# Patient Record
Sex: FEMALE | Race: WHITE | Employment: OTHER | URBAN - METROPOLITAN AREA
[De-identification: names, ages, dates, MRNs, and addresses within clinical notes are randomized per-mention and may not be internally consistent; named-entity substitution may affect disease eponyms.]

---

## 2019-03-21 NOTE — PROGRESS NOTES
[facility] / [facility], [place] ([facility]) [address], [address]    Acknowledgment of Informed Consent for Surgical or Medical Procedure and Sedation  I agree to allow doctor(s) Jacques Mullen and his/her associates or assistants, including residents and/or other qualified medical practitioner to perform the following medical treatment or procedure and to administer or direct the administration of sedation as necessary:  Procedure(s): GASTROCNEMIUS RECESSION RIGHT FOOT, PERONEUS LONGUS AND BREVIS TENDON REPAIRS, POSSIBLE TENDON TRANSFER RIGHT FOOT, CALCANEUS OSTEOTOMY RIGHT FOOT, POSSIBLE FIRST [address]  My doctor has explained the following regarding the proposed procedure:   the explanation of the procedure   the benefits of the procedure   the potential problems that might occur during recuperation   the risks and side effects of the procedure which could include but are not limited to severe blood loss, infection, stroke or death   the benefits, risks and side effect of alternative procedures including the consequences of declining this procedure or any alternative procedures   the likelihood of achieving satisfactory results. I acknowledge no guarantee or assurance has been made to me regarding the results. I understand that during the course of this treatment/procedure, unforeseen conditions can occur which require an additional or different procedure. I agree to allow my physician or assistants to perform such extension of the original procedure as they may find necessary. I understand that sedation will often result in temporary impairment of memory and fine motor skills and that sedation can occasionally progress to a state of deep sedation or general anesthesia.     I understand the risks of anesthesia for surgery include, but are not limited to, sore throat, hoarseness, injury to face, mouth, or teeth; nausea; headache; injury to blood vessels or nerves; death, brain damage, or paralysis. I understand that if I have a Limitation of Treatment order in effect during my hospitalization, the order may or may not be in effect during this procedure. I give my doctor permission to give me blood or blood products. I understand that there are risks with receiving blood such as hepatitis, AIDS, fever, or allergic reaction. I acknowledge that the risks, benefits, and alternatives of this treatment have been explained to me and that no express or implied warranty has been given by the hospital, any blood bank, or any person or entity as to the blood or blood components transfused. At the discretion of my doctor, I agree to allow observers, equipment/product representatives and allow photographing, and/or televising of the procedure, provided my name or identity is maintained confidentially. I agree the hospital may dispose of or use for scientific or educational purposes any tissue, fluid, or body parts which may be removed.     ________________________________Date________Time______ am/pm  ([place])  Patient or Signature of Closest Relative or Legal Guardian    ________________________________Date________Time______am/pm      Page 1 of  1  Witness

## 2019-03-25 ENCOUNTER — ANESTHESIA EVENT (OUTPATIENT)
Dept: OPERATING ROOM | Age: 69
End: 2019-03-25
Payer: MEDICARE

## 2019-03-26 NOTE — PROGRESS NOTES
PRE-OP INSTRUCTIONS FOR THE SURGICAL PATIENT YOU ARE UNABLE TO MAKE CONTACT FOR AN INTERVIEW:      1. Follow instructions for your ARRIVAL TIME as DIRECTED BY YOUR SURGEON. 2. Enter the MAIN entrance located on [facility] and report to the desk. 3. Bring your insurance & prescription card and photo ID with you. You may also be asked to pay a co-pay, as you may want to bring a check or credit card with you. 4. Leave all other valuables at home. 5. Arrange for someone to drive you home and be with you for the first 24 hours after discharge. 6. You must contact your surgeon for ALL medication instructions, especially if taking blood thinners, aspirin, or diabetic medication. 7. A Pre-op History and Physical for surgery MUST be completed by your Physician or an Urgent Care within 30 days of your procedure date. Please bring a copy with you on the day of your procedure and along with any other testing performed. 8. DO NOT EAT ANYTHING eight hours prior to surgery. May have up to 8 ounces of water 4 hours prior to surgery. 9. No gum, candy, mints, or ice chips day of procedure. 10. Please refrain from drinking alcohol the day before or day of your procedure. 11. Please do not smoke the day of your procedure. 12. Dress in loose, comfortable clothing appropriate for redressing after your procedure. Do not wear jewelry (including body piercings), make-up, fingernail polish, lotion, powders or metal hairclips. 15. Contacts will need to be removed prior to surgery. You may want to bring your            Eye glasses to wear immediately before and after surgery. 14. Dentures will need to be removed before your procedure. 13. Bring cases for your glasses, contacts, dentures, or hearing aids to protect them while you are in surgery. 16. If you use a CPAP, please bring it with you on the day of your procedure.   17. Do not shave or wax for 72 hours prior to procedure near your

## 2019-03-28 ENCOUNTER — ANESTHESIA (OUTPATIENT)
Dept: OPERATING ROOM | Age: 69
End: 2019-03-28
Payer: MEDICARE

## 2019-03-28 ENCOUNTER — HOSPITAL ENCOUNTER (OUTPATIENT)
Age: 69
Setting detail: OBSERVATION
Discharge: HOME | End: 2019-03-29
Attending: ORTHOPAEDIC SURGERY | Admitting: ORTHOPAEDIC SURGERY
Payer: MEDICARE

## 2019-03-28 VITALS — DIASTOLIC BLOOD PRESSURE: 44 MMHG | OXYGEN SATURATION: 96 % | SYSTOLIC BLOOD PRESSURE: 82 MMHG | TEMPERATURE: 95.5 F

## 2019-03-28 DIAGNOSIS — M76.72 PERONEAL TENDINITIS OF LEFT LOWER EXTREMITY: Primary | ICD-10-CM

## 2019-03-28 LAB
ANION GAP SERPL CALCULATED.3IONS-SCNC: 10 MMOL/L (ref 3–16)
BUN BLDV-MCNC: 11 MG/DL (ref 7–20)
CALCIUM SERPL-MCNC: 9 MG/DL (ref 8.3–10.6)
CHLORIDE BLD-SCNC: 106 MMOL/L (ref 99–110)
CO2: 26 MMOL/L (ref 21–32)
CREAT SERPL-MCNC: <0.5 MG/DL (ref 0.6–1.2)
GFR AFRICAN AMERICAN: >60
GFR NON-AFRICAN AMERICAN: >60
GLUCOSE BLD-MCNC: 115 MG/DL (ref 70–99)
HCT VFR BLD CALC: 39.8 % (ref 36–48)
HEMOGLOBIN: 13.1 G/DL (ref 12–16)
MCH RBC QN AUTO: 29.8 PG (ref 26–34)
MCHC RBC AUTO-ENTMCNC: 33 G/DL (ref 31–36)
MCV RBC AUTO: 90.4 FL (ref 80–100)
PDW BLD-RTO: 14.3 % (ref 12.4–15.4)
PLATELET # BLD: 186 K/UL (ref 135–450)
PMV BLD AUTO: 8 FL (ref 5–10.5)
POTASSIUM SERPL-SCNC: 4.3 MMOL/L (ref 3.5–5.1)
RBC # BLD: 4.4 M/UL (ref 4–5.2)
SODIUM BLD-SCNC: 142 MMOL/L (ref 136–145)
WBC # BLD: 8.7 K/UL (ref 4–11)

## 2019-03-28 PROCEDURE — C1713 ANCHOR/SCREW BN/BN,TIS/BN: HCPCS | Performed by: ORTHOPAEDIC SURGERY

## 2019-03-28 PROCEDURE — 2720000010 HC SURG SUPPLY STERILE: Performed by: ORTHOPAEDIC SURGERY

## 2019-03-28 PROCEDURE — 6360000002 HC RX W HCPCS: Performed by: ANESTHESIOLOGY

## 2019-03-28 PROCEDURE — 2580000003 HC RX 258: Performed by: ORTHOPAEDIC SURGERY

## 2019-03-28 PROCEDURE — 3700000001 HC ADD 15 MINUTES (ANESTHESIA): Performed by: ORTHOPAEDIC SURGERY

## 2019-03-28 PROCEDURE — 3700000000 HC ANESTHESIA ATTENDED CARE: Performed by: ORTHOPAEDIC SURGERY

## 2019-03-28 PROCEDURE — 88304 TISSUE EXAM BY PATHOLOGIST: CPT

## 2019-03-28 PROCEDURE — 7100000000 HC PACU RECOVERY - FIRST 15 MIN: Performed by: ORTHOPAEDIC SURGERY

## 2019-03-28 PROCEDURE — 6360000002 HC RX W HCPCS: Performed by: ORTHOPAEDIC SURGERY

## 2019-03-28 PROCEDURE — 7100000010 HC PHASE II RECOVERY - FIRST 15 MIN: Performed by: ORTHOPAEDIC SURGERY

## 2019-03-28 PROCEDURE — 7100000001 HC PACU RECOVERY - ADDTL 15 MIN: Performed by: ORTHOPAEDIC SURGERY

## 2019-03-28 PROCEDURE — 3600000004 HC SURGERY LEVEL 4 BASE: Performed by: ORTHOPAEDIC SURGERY

## 2019-03-28 PROCEDURE — 64447 NJX AA&/STRD FEMORAL NRV IMG: CPT | Performed by: ANESTHESIOLOGY

## 2019-03-28 PROCEDURE — 85027 COMPLETE CBC AUTOMATED: CPT

## 2019-03-28 PROCEDURE — C1769 GUIDE WIRE: HCPCS | Performed by: ORTHOPAEDIC SURGERY

## 2019-03-28 PROCEDURE — 3600000014 HC SURGERY LEVEL 4 ADDTL 15MIN: Performed by: ORTHOPAEDIC SURGERY

## 2019-03-28 PROCEDURE — 2709999900 HC NON-CHARGEABLE SUPPLY: Performed by: ORTHOPAEDIC SURGERY

## 2019-03-28 PROCEDURE — 64445 NJX AA&/STRD SCIATIC NRV IMG: CPT | Performed by: ANESTHESIOLOGY

## 2019-03-28 PROCEDURE — 6360000002 HC RX W HCPCS: Performed by: NURSE ANESTHETIST, CERTIFIED REGISTERED

## 2019-03-28 PROCEDURE — 36415 COLL VENOUS BLD VENIPUNCTURE: CPT

## 2019-03-28 PROCEDURE — 94761 N-INVAS EAR/PLS OXIMETRY MLT: CPT

## 2019-03-28 PROCEDURE — 80048 BASIC METABOLIC PNL TOTAL CA: CPT

## 2019-03-28 PROCEDURE — G0378 HOSPITAL OBSERVATION PER HR: HCPCS

## 2019-03-28 PROCEDURE — 2500000003 HC RX 250 WO HCPCS: Performed by: NURSE ANESTHETIST, CERTIFIED REGISTERED

## 2019-03-28 PROCEDURE — 2580000003 HC RX 258: Performed by: ANESTHESIOLOGY

## 2019-03-28 PROCEDURE — 2500000003 HC RX 250 WO HCPCS: Performed by: ANESTHESIOLOGY

## 2019-03-28 PROCEDURE — 7100000011 HC PHASE II RECOVERY - ADDTL 15 MIN: Performed by: ORTHOPAEDIC SURGERY

## 2019-03-28 RX ORDER — SODIUM CHLORIDE 9 MG/ML
INJECTION, SOLUTION INTRAVENOUS
Status: DISPENSED
Start: 2019-03-28 | End: 2019-03-29

## 2019-03-28 RX ORDER — HEPARIN SODIUM 5000 [USP'U]/ML
5000 INJECTION, SOLUTION INTRAVENOUS; SUBCUTANEOUS EVERY 8 HOURS SCHEDULED
Status: DISCONTINUED | OUTPATIENT
Start: 2019-03-29 | End: 2019-03-29 | Stop reason: HOSPADM

## 2019-03-28 RX ORDER — ROPIVACAINE HYDROCHLORIDE 5 MG/ML
INJECTION, SOLUTION EPIDURAL; INFILTRATION; PERINEURAL
Status: COMPLETED | OUTPATIENT
Start: 2019-03-28 | End: 2019-03-28

## 2019-03-28 RX ORDER — MIDAZOLAM HYDROCHLORIDE 1 MG/ML
INJECTION INTRAMUSCULAR; INTRAVENOUS PRN
Status: DISCONTINUED | OUTPATIENT
Start: 2019-03-28 | End: 2019-03-28 | Stop reason: SDUPTHER

## 2019-03-28 RX ORDER — ROPIVACAINE HYDROCHLORIDE 5 MG/ML
30 INJECTION, SOLUTION EPIDURAL; INFILTRATION; PERINEURAL ONCE
Status: DISCONTINUED | OUTPATIENT
Start: 2019-03-28 | End: 2019-03-28 | Stop reason: HOSPADM

## 2019-03-28 RX ORDER — CLINDAMYCIN PHOSPHATE 900 MG/50ML
INJECTION INTRAVENOUS PRN
Status: DISCONTINUED | OUTPATIENT
Start: 2019-03-28 | End: 2019-03-28 | Stop reason: SDUPTHER

## 2019-03-28 RX ORDER — MORPHINE SULFATE 2 MG/ML
2 INJECTION, SOLUTION INTRAMUSCULAR; INTRAVENOUS
Status: DISCONTINUED | OUTPATIENT
Start: 2019-03-28 | End: 2019-03-29 | Stop reason: HOSPADM

## 2019-03-28 RX ORDER — FENTANYL CITRATE 50 UG/ML
100 INJECTION, SOLUTION INTRAMUSCULAR; INTRAVENOUS ONCE
Status: COMPLETED | OUTPATIENT
Start: 2019-03-28 | End: 2019-03-28

## 2019-03-28 RX ORDER — ONDANSETRON 2 MG/ML
INJECTION INTRAMUSCULAR; INTRAVENOUS PRN
Status: DISCONTINUED | OUTPATIENT
Start: 2019-03-28 | End: 2019-03-28 | Stop reason: SDUPTHER

## 2019-03-28 RX ORDER — HYDROCODONE BITARTRATE AND ACETAMINOPHEN 5; 325 MG/1; MG/1
2 TABLET ORAL EVERY 4 HOURS PRN
Status: DISCONTINUED | OUTPATIENT
Start: 2019-03-28 | End: 2019-03-29 | Stop reason: HOSPADM

## 2019-03-28 RX ORDER — GLYCOPYRROLATE 0.2 MG/ML
INJECTION INTRAMUSCULAR; INTRAVENOUS PRN
Status: DISCONTINUED | OUTPATIENT
Start: 2019-03-28 | End: 2019-03-28 | Stop reason: SDUPTHER

## 2019-03-28 RX ORDER — ROCURONIUM BROMIDE 10 MG/ML
INJECTION, SOLUTION INTRAVENOUS PRN
Status: DISCONTINUED | OUTPATIENT
Start: 2019-03-28 | End: 2019-03-28 | Stop reason: SDUPTHER

## 2019-03-28 RX ORDER — HYDROCODONE BITARTRATE AND ACETAMINOPHEN 5; 325 MG/1; MG/1
1 TABLET ORAL EVERY 4 HOURS PRN
Status: DISCONTINUED | OUTPATIENT
Start: 2019-03-28 | End: 2019-03-29 | Stop reason: HOSPADM

## 2019-03-28 RX ORDER — SODIUM CHLORIDE 9 MG/ML
INJECTION, SOLUTION INTRAVENOUS CONTINUOUS
Status: DISCONTINUED | OUTPATIENT
Start: 2019-03-28 | End: 2019-03-29 | Stop reason: HOSPADM

## 2019-03-28 RX ORDER — SODIUM CHLORIDE 0.9 % (FLUSH) 0.9 %
10 SYRINGE (ML) INJECTION PRN
Status: DISCONTINUED | OUTPATIENT
Start: 2019-03-28 | End: 2019-03-29 | Stop reason: HOSPADM

## 2019-03-28 RX ORDER — GLYCOPYRROLATE 0.2 MG/ML
0.1 INJECTION INTRAMUSCULAR; INTRAVENOUS ONCE
Status: COMPLETED | OUTPATIENT
Start: 2019-03-28 | End: 2019-03-28

## 2019-03-28 RX ORDER — SODIUM CHLORIDE, SODIUM LACTATE, POTASSIUM CHLORIDE, CALCIUM CHLORIDE 600; 310; 30; 20 MG/100ML; MG/100ML; MG/100ML; MG/100ML
INJECTION, SOLUTION INTRAVENOUS CONTINUOUS
Status: DISCONTINUED | OUTPATIENT
Start: 2019-03-28 | End: 2019-03-29 | Stop reason: HOSPADM

## 2019-03-28 RX ORDER — DEXAMETHASONE SODIUM PHOSPHATE 4 MG/ML
INJECTION, SOLUTION INTRA-ARTICULAR; INTRALESIONAL; INTRAMUSCULAR; INTRAVENOUS; SOFT TISSUE PRN
Status: DISCONTINUED | OUTPATIENT
Start: 2019-03-28 | End: 2019-03-28 | Stop reason: SDUPTHER

## 2019-03-28 RX ORDER — PROPOFOL 10 MG/ML
INJECTION, EMULSION INTRAVENOUS PRN
Status: DISCONTINUED | OUTPATIENT
Start: 2019-03-28 | End: 2019-03-28 | Stop reason: SDUPTHER

## 2019-03-28 RX ORDER — MORPHINE SULFATE 2 MG/ML
4 INJECTION, SOLUTION INTRAMUSCULAR; INTRAVENOUS
Status: DISCONTINUED | OUTPATIENT
Start: 2019-03-28 | End: 2019-03-29 | Stop reason: HOSPADM

## 2019-03-28 RX ORDER — CEFAZOLIN SODIUM 2 G/50ML
2 SOLUTION INTRAVENOUS ONCE
Status: DISCONTINUED | OUTPATIENT
Start: 2019-03-28 | End: 2019-03-28 | Stop reason: HOSPADM

## 2019-03-28 RX ORDER — MIDAZOLAM HYDROCHLORIDE 1 MG/ML
4 INJECTION INTRAMUSCULAR; INTRAVENOUS
Status: COMPLETED | OUTPATIENT
Start: 2019-03-28 | End: 2019-03-28

## 2019-03-28 RX ORDER — SODIUM CHLORIDE 0.9 % (FLUSH) 0.9 %
10 SYRINGE (ML) INJECTION EVERY 12 HOURS SCHEDULED
Status: DISCONTINUED | OUTPATIENT
Start: 2019-03-28 | End: 2019-03-29 | Stop reason: HOSPADM

## 2019-03-28 RX ORDER — CEFAZOLIN SODIUM 2 G/50ML
2 SOLUTION INTRAVENOUS EVERY 8 HOURS
Status: COMPLETED | OUTPATIENT
Start: 2019-03-28 | End: 2019-03-29

## 2019-03-28 RX ORDER — FENTANYL CITRATE 50 UG/ML
INJECTION, SOLUTION INTRAMUSCULAR; INTRAVENOUS PRN
Status: DISCONTINUED | OUTPATIENT
Start: 2019-03-28 | End: 2019-03-28 | Stop reason: SDUPTHER

## 2019-03-28 RX ORDER — ONDANSETRON 2 MG/ML
4 INJECTION INTRAMUSCULAR; INTRAVENOUS EVERY 6 HOURS PRN
Status: DISCONTINUED | OUTPATIENT
Start: 2019-03-28 | End: 2019-03-29 | Stop reason: HOSPADM

## 2019-03-28 RX ADMIN — PHENYLEPHRINE HYDROCHLORIDE 100 MCG: 10 INJECTION, SOLUTION INTRAMUSCULAR; INTRAVENOUS; SUBCUTANEOUS at 09:55

## 2019-03-28 RX ADMIN — SODIUM CHLORIDE, POTASSIUM CHLORIDE, SODIUM LACTATE AND CALCIUM CHLORIDE: 600; 310; 30; 20 INJECTION, SOLUTION INTRAVENOUS at 07:45

## 2019-03-28 RX ADMIN — PHENYLEPHRINE HYDROCHLORIDE 200 MCG: 10 INJECTION, SOLUTION INTRAMUSCULAR; INTRAVENOUS; SUBCUTANEOUS at 08:44

## 2019-03-28 RX ADMIN — CEFAZOLIN SODIUM 2 G: 2 SOLUTION INTRAVENOUS at 15:57

## 2019-03-28 RX ADMIN — ONDANSETRON 4 MG: 2 INJECTION INTRAMUSCULAR; INTRAVENOUS at 09:47

## 2019-03-28 RX ADMIN — ROPIVACAINE HYDROCHLORIDE 15 ML: 5 INJECTION, SOLUTION EPIDURAL; INFILTRATION; PERINEURAL at 07:50

## 2019-03-28 RX ADMIN — SODIUM CHLORIDE, SODIUM LACTATE, POTASSIUM CHLORIDE, AND CALCIUM CHLORIDE: 600; 310; 30; 20 INJECTION, SOLUTION INTRAVENOUS at 09:14

## 2019-03-28 RX ADMIN — PHENYLEPHRINE HYDROCHLORIDE 100 MCG: 10 INJECTION, SOLUTION INTRAMUSCULAR; INTRAVENOUS; SUBCUTANEOUS at 08:41

## 2019-03-28 RX ADMIN — MIDAZOLAM HYDROCHLORIDE 2 MG: 2 INJECTION, SOLUTION INTRAMUSCULAR; INTRAVENOUS at 08:32

## 2019-03-28 RX ADMIN — ROCURONIUM BROMIDE 50 MG: 10 INJECTION, SOLUTION INTRAVENOUS at 08:36

## 2019-03-28 RX ADMIN — FENTANYL CITRATE 50 MCG: 50 INJECTION INTRAMUSCULAR; INTRAVENOUS at 07:45

## 2019-03-28 RX ADMIN — FENTANYL CITRATE 100 MCG: 50 INJECTION INTRAMUSCULAR; INTRAVENOUS at 08:36

## 2019-03-28 RX ADMIN — PHENYLEPHRINE HYDROCHLORIDE 100 MCG: 10 INJECTION, SOLUTION INTRAMUSCULAR; INTRAVENOUS; SUBCUTANEOUS at 09:29

## 2019-03-28 RX ADMIN — SODIUM CHLORIDE, SODIUM LACTATE, POTASSIUM CHLORIDE, AND CALCIUM CHLORIDE: 600; 310; 30; 20 INJECTION, SOLUTION INTRAVENOUS at 08:31

## 2019-03-28 RX ADMIN — PHENYLEPHRINE HYDROCHLORIDE 100 MCG: 10 INJECTION, SOLUTION INTRAMUSCULAR; INTRAVENOUS; SUBCUTANEOUS at 09:18

## 2019-03-28 RX ADMIN — LIDOCAINE HYDROCHLORIDE 100 MG: 20 INJECTION, SOLUTION INTRAVENOUS at 08:36

## 2019-03-28 RX ADMIN — ONDANSETRON 4 MG: 2 INJECTION INTRAMUSCULAR; INTRAVENOUS at 08:51

## 2019-03-28 RX ADMIN — PHENYLEPHRINE HYDROCHLORIDE 100 MCG: 10 INJECTION, SOLUTION INTRAMUSCULAR; INTRAVENOUS; SUBCUTANEOUS at 09:25

## 2019-03-28 RX ADMIN — ROPIVACAINE HYDROCHLORIDE 30 ML: 5 INJECTION, SOLUTION EPIDURAL; INFILTRATION; PERINEURAL at 07:45

## 2019-03-28 RX ADMIN — CEFAZOLIN SODIUM 2 G: 2 SOLUTION INTRAVENOUS at 23:41

## 2019-03-28 RX ADMIN — GLYCOPYRROLATE 0.1 MG: 0.2 INJECTION, SOLUTION INTRAMUSCULAR; INTRAVENOUS at 08:10

## 2019-03-28 RX ADMIN — SODIUM CHLORIDE: 9 INJECTION, SOLUTION INTRAVENOUS at 13:28

## 2019-03-28 RX ADMIN — GLYCOPYRROLATE 0.4 MG: 0.2 INJECTION, SOLUTION INTRAMUSCULAR; INTRAVENOUS at 08:44

## 2019-03-28 RX ADMIN — Medication 10 ML: at 20:22

## 2019-03-28 RX ADMIN — SUGAMMADEX 200 MG: 100 INJECTION, SOLUTION INTRAVENOUS at 09:47

## 2019-03-28 RX ADMIN — SODIUM CHLORIDE, SODIUM LACTATE, POTASSIUM CHLORIDE, AND CALCIUM CHLORIDE: 600; 310; 30; 20 INJECTION, SOLUTION INTRAVENOUS at 10:04

## 2019-03-28 RX ADMIN — PROPOFOL 150 MG: 10 INJECTION, EMULSION INTRAVENOUS at 08:36

## 2019-03-28 RX ADMIN — MIDAZOLAM 2 MG: 1 INJECTION INTRAMUSCULAR; INTRAVENOUS at 07:45

## 2019-03-28 RX ADMIN — CLINDAMYCIN PHOSPHATE 900 MG: 18 INJECTION, SOLUTION INTRAVENOUS at 08:51

## 2019-03-28 RX ADMIN — DEXAMETHASONE SODIUM PHOSPHATE 8 MG: 4 INJECTION, SOLUTION INTRAMUSCULAR; INTRAVENOUS at 08:51

## 2019-03-28 RX ADMIN — PHENYLEPHRINE HYDROCHLORIDE 100 MCG: 10 INJECTION, SOLUTION INTRAMUSCULAR; INTRAVENOUS; SUBCUTANEOUS at 09:10

## 2019-03-28 ASSESSMENT — PULMONARY FUNCTION TESTS
PIF_VALUE: 2
PIF_VALUE: 18
PIF_VALUE: 16
PIF_VALUE: 3
PIF_VALUE: 12
PIF_VALUE: 18
PIF_VALUE: 4
PIF_VALUE: 13
PIF_VALUE: 18
PIF_VALUE: 29
PIF_VALUE: 16
PIF_VALUE: 18
PIF_VALUE: 18
PIF_VALUE: 3
PIF_VALUE: 4
PIF_VALUE: 2
PIF_VALUE: 19
PIF_VALUE: 3
PIF_VALUE: 2
PIF_VALUE: 12
PIF_VALUE: 3
PIF_VALUE: 18
PIF_VALUE: 18
PIF_VALUE: 19
PIF_VALUE: 0
PIF_VALUE: 18
PIF_VALUE: 17
PIF_VALUE: 12
PIF_VALUE: 13
PIF_VALUE: 16
PIF_VALUE: 17
PIF_VALUE: 18
PIF_VALUE: 17
PIF_VALUE: 18
PIF_VALUE: 24
PIF_VALUE: 2
PIF_VALUE: 17
PIF_VALUE: 18
PIF_VALUE: 2
PIF_VALUE: 0
PIF_VALUE: 2
PIF_VALUE: 12
PIF_VALUE: 0
PIF_VALUE: 9
PIF_VALUE: 18
PIF_VALUE: 17
PIF_VALUE: 18
PIF_VALUE: 19
PIF_VALUE: 12
PIF_VALUE: 17
PIF_VALUE: 16
PIF_VALUE: 1
PIF_VALUE: 12
PIF_VALUE: 2
PIF_VALUE: 15
PIF_VALUE: 17
PIF_VALUE: 2
PIF_VALUE: 18
PIF_VALUE: 15
PIF_VALUE: 18
PIF_VALUE: 18
PIF_VALUE: 17
PIF_VALUE: 18
PIF_VALUE: 17
PIF_VALUE: 18
PIF_VALUE: 16
PIF_VALUE: 15
PIF_VALUE: 17
PIF_VALUE: 1
PIF_VALUE: 1
PIF_VALUE: 18
PIF_VALUE: 12
PIF_VALUE: 18
PIF_VALUE: 18
PIF_VALUE: 4
PIF_VALUE: 17
PIF_VALUE: 17
PIF_VALUE: 18
PIF_VALUE: 15
PIF_VALUE: 1
PIF_VALUE: 16
PIF_VALUE: 15
PIF_VALUE: 14
PIF_VALUE: 2
PIF_VALUE: 17
PIF_VALUE: 18
PIF_VALUE: 0
PIF_VALUE: 15
PIF_VALUE: 28
PIF_VALUE: 18
PIF_VALUE: 17
PIF_VALUE: 7
PIF_VALUE: 17
PIF_VALUE: 18
PIF_VALUE: 16
PIF_VALUE: 28
PIF_VALUE: 18
PIF_VALUE: 0
PIF_VALUE: 17
PIF_VALUE: 3
PIF_VALUE: 15
PIF_VALUE: 18

## 2019-03-28 ASSESSMENT — PAIN SCALES - GENERAL
PAINLEVEL_OUTOF10: 2
PAINLEVEL_OUTOF10: 0

## 2019-03-28 NOTE — BRIEF OP NOTE
Brief Postoperative Note  ______________________________________________________________    Patient: Sariah Robison  YOB: 1950  MRN: [id number]  Date of Procedure: 3/28/2019    Pre-Op Diagnosis: PERONEAL TENDINITIS RIGHT LEG, OTHER ACQUIRED CALCANEUS DEFORMITY, CAVOVARUS DEFORMITY RIGHT FOOT, ACQUIRED RIGHT SHORT ACHILLES TENDON    Post-Op Diagnosis: Same       Procedure(s):  GASTROCNEMIUS RECESSION RIGHT  RIGHT PERONEUS LONGUS AND BREVIS TENDON REPAIRS  CALCANEOUS OSTEOTOOMY, CALCANEOUS OSTECTOMY    Anesthesia: Regional, General    Surgeon(s):  MD Priti Redmond MD    Assistant: Danny Mansfield PA-C    Estimated Blood Loss (mL): less than 50     Complications: None    Specimens:   ID Type Source Tests Collected by Time Destination   A : PERONEAL TENDON TEAR Tissue Tissue SURGICAL PATHOLOGY Priti Mtz MD 3/28/2019 7755        Implants:  * No implants in log *      Drains: * No LDAs found *    Findings: See Above.      Priti Mtz MD  Date: 3/28/2019  Time: 11:37 AM

## 2019-03-28 NOTE — H&P
Evon Berry    [id number]    [facility] Same Day Surgery Update H & P  Department of General Surgery   Surgical Service   Pre-operative History and Physical  Last H & P within the last 30 days. DIAGNOSIS:   PERONEAL TENDINITIS RIGHT LEG, OTHER ACQUIRED CALCANEUS DEFORMITY, CAVOVARUS DEFORMITY RIGHT FOOT, ACQUIRED RIGHT SHORT ACHILLES TENDON    PROCEDURE:  WI GASTROCNEMIUS RECESSION [29774] (GASTROCNEMIUS RECESSION)  WI REPAIR 1 COLLAT ANKLE LIGMNT,PRIMARY [84997] (ANKLE LIGAMENT TENDON REPAIR)  FOOT OSTEOTOMY     HISTORY OF PRESENT ILLNESS:    Patient with chronic right foot/ankle pain and instability. The symptoms have been recalcitrant to conservative treatment and the patient presents today for the above procedure.       Past Medical History:        Diagnosis Date    Duodenal ulcer      Past Surgical History:        Procedure Laterality Date    BREAST BIOPSY Left     CHOLECYSTECTOMY      COLON SURGERY      COLONOSCOPY      ENDOSCOPY, COLON, DIAGNOSTIC      GASTRECTOMY      maxim  ulcer    TONSILLECTOMY AND ADENOIDECTOMY       Past Social History:  Social History     Socioeconomic History    Marital status:      Spouse name: None    Number of children: None    Years of education: None    Highest education level: None   Occupational History    None   Social Needs    Financial resource strain: None    Food insecurity:     Worry: None     Inability: None    Transportation needs:     Medical: None     Non-medical: None   Tobacco Use    Smoking status: Former Smoker     Types: Cigarettes     Last attempt to quit:      Years since quittin.2    Smokeless tobacco: Former User   Substance and Sexual Activity    Alcohol use: Yes     Comment: drinks a little    Drug use: Never    Sexual activity: None   Lifestyle    Physical activity:     Days per week: None     Minutes per session: None    Stress: None   Relationships    Social connections:     Talks on phone: None     Gets

## 2019-03-28 NOTE — PROGRESS NOTES
Time out done for  Right popliteal and adductor blocks  IV meds given   O2 3L/per cannula  Pt stable  Cont monitoring  Blocks completed and tolerated well by pt   To Or per stretcher with OR team present

## 2019-03-28 NOTE — PROGRESS NOTES
PACU Transfer to [facility]    Vitals:    03/28/19 1100   BP: 110/71   Pulse: 86   Resp: 17   Temp: 97 °F (36.1 °C)   SpO2: 97%         Intake/Output Summary (Last 24 hours) at 3/28/2019 1101  Last data filed at 3/28/2019 1101  Gross per 24 hour   Intake 1650 ml   Output 0 ml   Net 1650 ml       Pain assessment:  headaches  Pain Level: 0(headache)    Patient transferred to care of [facility] RN.    3/28/2019 11:01 AM

## 2019-03-28 NOTE — PROGRESS NOTES
Patient admitted to 5th floor from same day surgery post Right ankle POD #0. VSS. No complaints of pain. Ace wrap CD&I. No reports of nausea/ vomiting. Tolerating clear liquids. Admission documentation complete & pt oriented to room & use of call light. Will continue to monitor.

## 2019-03-28 NOTE — PROGRESS NOTES
Patient arrived in PACU post Gastrocnemius Recession Right with Dr. Katharine Zayas. Patient was connected to bedside monitors; VSS. Per CRNA patient was stable intra-op. Patient resting comfortably with no complaints of pain.

## 2019-03-28 NOTE — PLAN OF CARE
Problem: Falls - Risk of:  Goal: Will remain free from falls  Description  Will remain free from falls  Outcome: Met This Shift  Note:   Patient free of falls/injury during duration of shift. Patient assessed as a high fall risk. Bed in lowest position, wheels locked, call light w/in reach, 2/4 side rails up, bed alarm on, fall risk visual posted outside door & fall risk ID on. Patient instructed to call for assistance prior to getting out of bed. Will continue to monitor.

## 2019-03-29 VITALS
RESPIRATION RATE: 16 BRPM | TEMPERATURE: 98.4 F | SYSTOLIC BLOOD PRESSURE: 105 MMHG | OXYGEN SATURATION: 96 % | DIASTOLIC BLOOD PRESSURE: 63 MMHG | BODY MASS INDEX: 24.99 KG/M2 | HEART RATE: 72 BPM | WEIGHT: 150 LBS | HEIGHT: 65 IN

## 2019-03-29 PROCEDURE — G0378 HOSPITAL OBSERVATION PER HR: HCPCS

## 2019-03-29 PROCEDURE — 2580000003 HC RX 258: Performed by: ORTHOPAEDIC SURGERY

## 2019-03-29 RX ORDER — HYDROCODONE BITARTRATE AND ACETAMINOPHEN 5; 325 MG/1; MG/1
1-2 TABLET ORAL
Qty: 40 TABLET | Refills: 0 | COMMUNITY
Start: 2019-03-29 | End: 2019-04-05

## 2019-03-29 RX ORDER — ASPIRIN 325 MG
325 TABLET, DELAYED RELEASE (ENTERIC COATED) ORAL 2 TIMES DAILY WITH MEALS
Qty: 30 TABLET | Refills: 3 | COMMUNITY
Start: 2019-03-29

## 2019-03-29 RX ADMIN — Medication 10 ML: at 08:04

## 2019-03-29 ASSESSMENT — PAIN SCALES - GENERAL: PAINLEVEL_OUTOF10: 0

## 2019-03-29 NOTE — PROGRESS NOTES
Pt a/o x4. VSS. No reports of nausea/ vomiting. Tolerating general diet. No complaints of pain. Block still in effect. Pt has numbess/ tingling from ankle down to foot. Ace wrap dressing CDI. Urinary output adequate. Patient discharged home w/ spouse. Discharge instructions explained. Pt verbalized understanding. IV removed.

## 2019-03-29 NOTE — DISCHARGE SUMMARY
ORTHOCINCY  DISCHARGE SUMMARY    Procedure:  GASTROCNEMIUS RECESSION RIGHT  RIGHT PERONEUS LONGUS AND BREVIS TENDON REPAIRS  CALCANEOUS OSTEOTOOMY, CALCANEOUS OSTECTOMY    The patient was taken to the operating room on 3/28/2019 where the aforementioned procedure was preformed. The patient was taken to the post operative anesthesia recovery unit in stable condition. The patient was then transferred to the orthopaedic floor for post operative pain management and convalesce       The patient was followed medically in the hospital for the above surgical procedures performed and below medical issues during their hospital stay    Active Problems:    Peroneal tendinitis of left lower extremity  Resolved Problems:    * No resolved hospital problems. *         (x )The patient was placed on anticoagulation therapy for DVT prophylaxis -- Aspirin 325 mg bid upon discharge from hospital.      The patient was discharged in stable condition on 3/29/2019. Please see medical reconciliation for discharge medications. The discharge instructions were explained to the patient and the family. The patient will follow up in the office at her scheduled post-op appointments.

## 2019-03-29 NOTE — PLAN OF CARE
Problem: Falls - Risk of:  Goal: Will remain free from falls  Description  Will remain free from falls  3/28/2019 2202 by Jolene Teixeira RN  Outcome: Ongoing  Note:   Hourly rounding on patient for needs. Non-skid socks on, bed in lowest position and locked. Bedside table, personal belongs, and nurse call light within reach. Instructed patient to use call light for assistance. Bed alarm on. Floor clear of clutter. Patient remains free of falls at this time. Will continue to monitor. Problem: Pain:  Goal: Pain level will decrease  Description  Pain level will decrease  3/28/2019 2202 by Jolene Teixeira RN  Outcome: Ongoing  Note:   Pt denies pain at this time, pt exhibits no objective characteristics of pain, will continue to monitor. No complaints of pain.

## 2019-03-29 NOTE — CARE COORDINATION
Case Management Daily Note:    Current Plan of Care: DC home today. NWB on RLE       PT AM-PAC: N/A    OT AM-PAC: N/A    DME needs: N/A        Discharge Plan: Home at discharge     Tentative Discharge Date: 3/29    Current Barriers to Discharge: none    Resources/Information given: None      Case Management Notes: Patient to discharge home today. Spouse to provide transport. No home needs at discharge.           Norberto Sutherland, MSW, LSW     [facility]   [phone]

## 2019-03-29 NOTE — PROGRESS NOTES
Ortho Progress Note    POD 1 s/p calcaneus osteotomy, peroneal tendon repair, and gastrocnemius recession. She has no complaints at this time. She does not feel as if she needs therapy prior to discharge since she has been nonweight bearing on her right lower extremity for 2+ months prior to surgery. Dressings clean, dry and intact. Block still intact. Good capillary refill in all digits. Continue NWB on operative extremity. D/c to home today. Heparin for DVT prophylaxis while in house (will switch to Aspirin 325 mg outpatient).

## 2019-03-29 NOTE — PROGRESS NOTES
Patient is a/o x4, very pleasant, calm and cooperative. Patient denies c/o nausea/pain. Patient endorses decreased sensation to R foot, some N/T at the ankle/heel. RLE elevated w/pillow and foot scruggs of bed up. Patient is voiding adequately via bedpan at this time. VSS. Non-skid socks on, SCD'S remain in place. Fall precautions in place. Bed locked and in lowest position, bedside table and nurse call light within reach. Instructed patient to call out for assistance. Patient remains free from falls at this time, will continue to monitor.

## 2019-03-30 NOTE — OP NOTE
[address], [address]                                OPERATIVE REPORT    PATIENT NAME: Amanda Wesley                    :        1950  MED REC NO:   [id number]                          ROOM:       6607  ACCOUNT NO:   [de-identified]                           ADMIT DATE: 2019  PROVIDER:     Alfie Alvarez MD    DATE OF PROCEDURE:  2019    PREOPERATIVE DIAGNOSES:   Right  1. Peroneal Tendon Tear  2. Calcaneal Varus Deformity  3. Equinus Contracture  4. Hypertrophic Peroneal Tubercle    POSTOPERATIVE DIAGNOSES:    Right  1. Peroneal Tendon Tear  2. Calcaneal Varus Deformity  3. Equinus Contracture  4. Hypertrophic Peroneal Tubercle    OPERATIONS PERFORMED:  Right  1. Repair of peroneus longus tendon tear. 2.  Calcaneus osteotomy (valgus-induced and lateral slide/lateral  closing wedge osteotomy). 3.  Gastrocnemius recession. 4.  Ostectomy calcaneus. SURGEON:  Alfie Alvarez MD    SECOND SURGEON:  Maria Eugenia Lamb    ANESTHESIA:  General with block. INDICATIONS:  This is a 69-year-old woman with a history of significant  pain and discomfort along the lateral aspect of her ankle. The patient  had developed a peroneus longus tendon tear as well as a varus deformity  of her hindfoot and elevated longitudinal arch height. The patient has  failed conservative management and has elected for the above outlined  procedures in an effort to alleviate her chronic pain. The risks and  potential benefits of the procedure were discussed with the patient. She has been given the opportunity to ask questions and her questions  were answered to her satisfaction. DESCRIPTION OF PROCEDURE:  The patient was brought to the operating  room, placed in supine position on the operating table.   After induction  of a general anesthetic, the patient was turned to a semi-lateral  decubitus position on a beanbag patient positioner. A gel axillary roll  was applied. A pneumatic tourniquet was placed on the patient's right  proximal thigh and set to 300 mmHg. The right leg was then prepped and  draped free in the usual sterile fashion. A second surgeon was necessary throughout the procedure due to the  patient's increased size and body mass index. In addition, a second  surgeon was necessary to aid with positioning of the patient during the  procedure and to aid with appropriate positioning of the osteotomy. A  second surgeon was necessary due to major deformity correction. A  second surgeon was necessary to aid with operation on the multiplanar  fluoroscopy unit. A second surgeon was necessary to decrease overall  operative time and to improve the patient's safety and outcome. Gastrocnemius Recession: At this point, an intraoperative [place]  test was performed. This revealed a significant equinus contracture and  a positive a Sil[place] test was appreciated. Incision was therefore  made in line with the axis of the extremity centered over the distal  aponeurosis of the gastrocnemius muscle. Blunt dissection was carried  out through the subcutaneous and deeper tissue taking care to identify  and protect the sural nerve and lesser saphenous vein. Deeper  dissection revealed the overlying aponeurosis of the gastrocnemius  muscle. This was divided under direct visualization with a #15 scalpel  while pulling tension in the muscle by dorsiflexing the ankle. Care was  taken to divide only the aponeurosis and not the deeper underlying  muscle fibers. This allowed the muscle to stretch about 3 cm, which  nicely corrected the equinus contracture. Wound was irrigated and  closed with 3-0 nylon suture. Calcaneus Osteotomy:  At this point, attention was turned to the  calcaneal osteotomy. The extremity was exsanguinated with the Esmarch  tourniquet and the pneumatic tourniquet inflated. longitudinal  tear extending from 1 cm proximal to the tip of the fibula distal to the  peroneal tubercle. The peroneal tubercle was noted to be quite sharp  and would require ostectomy. Once the peroneus brevis tendon was  inspected and noted to be free of degeneration or tearing, a  side-to-side repair of the peroneus longus tendon was performed with a  4-0 FiberWire suture applied with a running [place]-type technique. This  afforded excellent repair of the peroneus longus tendon and allowed  re-tubularization of the tendon. Ostectomy Of Calcaneus: At this point, the incision was extended over  the peroneal tubercle and dissection carried out into the peroneal  tendon sheath superiorly and inferiorly. The peroneal tubercle was  quite large and degenerative. This had osteophytes present, which  appeared to be cutting into both the peroneus longus and brevis tendons. This was clearly the source of the peroneus longus tendon tear. Also an  inferior calcaneal osteophyte was present at the peroneus longus tendon  sheath. An AO chisel was used to perform an ostectomy at this level of  the entire peroneal tubercle. The entire area was then smoothed with a  rongeur. The wounds were irrigated with a sterile lavage solution. At  this point, attention was turned to closure. Final radiographs were  obtained with multiplanar fluoroscopy. The peroneal retinaculum was  meticulously reapproximated with interrupted 2-0 PDS suture. The  subcutaneous tissue reapproximated with 3-0 Vicryl suture. The skin  edges reapproximated with interrupted 3-0 nylon. There were no drains and no complications. The sponge and needle counts  were noted be correct at the of the procedure by the nursing staff. Following closure, a sterile lightly compressive dressing and a well  padded posterior splint was applied with the ankle in neutral  dorsiflexion.         Jesus Seay MD    D: 03/29/2019 18:31:18       T: 03/29/2019 18:33:00     [email or web address]  Job#: [id number]     Doc#: [id number]    CC:

## (undated) DEVICE — AIRLIFE™ ADULT OXYGEN MASK VINYL, UNDER THE CHIN STYLE, 3 IN 1 MASK WITH SAFETY VENT, WITH 7 FEET (2.1 M) CRUSH RESISTANT OXYGEN TUBING: Brand: AIRLIFE™

## (undated) DEVICE — DRESSING,GAUZE,XEROFORM,CURAD,1"X8",ST: Brand: CURAD

## (undated) DEVICE — BANDAGE COBAN 4 IN COMPR W4INXL5YD FOAM COHESIVE QUIK STK SELF ADH SFT

## (undated) DEVICE — PADDING CAST W4INXL4YD HIGHLY ABSRB THAN COT EZ APPL

## (undated) DEVICE — INTENDED FOR TISSUE SEPARATION, AND OTHER PROCEDURES THAT REQUIRE A SHARP SURGICAL BLADE TO PUNCTURE OR CUT.: Brand: BARD-PARKER ® CARBON RIB-BACK BLADES

## (undated) DEVICE — 2108 SERIES SAGITTAL BLADE (24.8 X 0.88 X 73.8MM)

## (undated) DEVICE — BIT DRL L300MM DIA5MM CANN QUIK CPL ADJ STP REUSE

## (undated) DEVICE — PODIATRY PK

## (undated) DEVICE — SKIN MARKER,REGULAR TIP WITH RULER AND LABELS: Brand: DEVON

## (undated) DEVICE — 3M™ STERI-DRAPE™ U-DRAPE 1015: Brand: STERI-DRAPE™

## (undated) DEVICE — ROYAL SILK SURGICAL GOWN, XL: Brand: CONVERTORS

## (undated) DEVICE — E-Z CLEAN, NON-STICK, PTFE COATED, ELECTROSURGICAL BLADE ELECTRODE, 2.5 INCH (6.35 CM): Brand: EZ CLEAN

## (undated) DEVICE — SOLUTION IV 1000ML 0.9% SOD CHL

## (undated) DEVICE — GARMENT,MEDLINE,DVT,INT,CALF,LG, GEN2: Brand: MEDLINE

## (undated) DEVICE — GLOVE SURG SZ 65 THK91MIL LTX FREE SYN POLYISOPRENE

## (undated) DEVICE — CHLORAPREP 26ML ORANGE

## (undated) DEVICE — SUTURE ETHLN SZ 3-0 L18IN NONABSORBABLE BLK FS-1 L24MM 3/8 663H

## (undated) DEVICE — SUTURE SZ 0 27IN 5/8 CIR UR-6  TAPER PT VIOLET ABSRB VICRYL J603H

## (undated) DEVICE — ROLL COT W11.5INXL11FT 1LB HIGHLY ABSRB SURG GRD

## (undated) DEVICE — PLATE ES AD W 9FT CRD 2

## (undated) DEVICE — WAX SURG 2.5GM HEMSTAT BNE BEESWAX PARAFFIN ISO PALMITATE

## (undated) DEVICE — SUTURE VCRL SZ 2-0 L27IN ABSRB UD L26MM SH 1/2 CIR J417H

## (undated) DEVICE — 3M™ IOBAN™ 2 ANTIMICROBIAL INCISE DRAPE 6640EZ: Brand: IOBAN™ 2

## (undated) DEVICE — GUIDEWIRE ORTH L300MM DIA2.8MM S STL THRD SHRP TRCR TIP FOR

## (undated) DEVICE — GAUZE,SPONGE,4"X4",16PLY,XRAY,STRL,LF: Brand: MEDLINE

## (undated) DEVICE — SUTURE VCRL SZ 3-0 L27IN ABSRB UD L26MM SH 1/2 CIR J416H

## (undated) DEVICE — BANDAGE,ELASTIC,ESMARK,STERILE,6"X9',LF: Brand: MEDLINE

## (undated) DEVICE — VELCLOSE LATEX FREE ELASTIC BANDAGE D/L 6INX10YD

## (undated) DEVICE — GLOVE SURG SZ 85 L1185IN FNGR THK75MIL STRW LTX POLYMER

## (undated) DEVICE — ROYAL SILK SURGICAL GOWN, XXXL, LONG: Brand: CONVERTORS

## (undated) DEVICE — TURNOVER KIT RM INF CTRL TECH

## (undated) DEVICE — STERILE POLYISOPRENE POWDER-FREE SURGICAL GLOVES WITH EMOLLIENT COATING: Brand: PROTEXIS

## (undated) DEVICE — SYRINGE, LUER LOCK, 10ML: Brand: MEDLINE

## (undated) DEVICE — SPLINT ORTH W5XL30IN WHT FBRGLS 1 SIDE FELT PD CNFRM LO

## (undated) DEVICE — DRAPE C ARM W54XL84IN MINI FOR OEC 6800

## (undated) DEVICE — DRAPE EQUIP FOOTSWITCH 24X20 IN PUL CORDAND CRD LCK NS

## (undated) DEVICE — SUTURE ETHLN SZ 3-0 L18IN NONABSORBABLE BLK PS-2 L19MM 3/8 1669H